# Patient Record
Sex: FEMALE | Race: WHITE | Employment: OTHER | URBAN - NONMETROPOLITAN AREA
[De-identification: names, ages, dates, MRNs, and addresses within clinical notes are randomized per-mention and may not be internally consistent; named-entity substitution may affect disease eponyms.]

---

## 2022-05-24 ENCOUNTER — INITIAL CONSULT (OUTPATIENT)
Dept: PULMONOLOGY | Age: 47
End: 2022-05-24

## 2022-05-24 VITALS
TEMPERATURE: 96.6 F | HEIGHT: 64 IN | WEIGHT: 174.8 LBS | SYSTOLIC BLOOD PRESSURE: 136 MMHG | DIASTOLIC BLOOD PRESSURE: 82 MMHG | OXYGEN SATURATION: 96 % | BODY MASS INDEX: 29.84 KG/M2 | HEART RATE: 86 BPM

## 2022-05-24 DIAGNOSIS — R53.82 CHRONIC FATIGUE: ICD-10-CM

## 2022-05-24 DIAGNOSIS — G47.8 SLEEP PARALYSIS: ICD-10-CM

## 2022-05-24 DIAGNOSIS — R06.89 SLEEP RELATED CHOKING SENSATION: ICD-10-CM

## 2022-05-24 DIAGNOSIS — G47.10 HYPERSOMNIA: Primary | ICD-10-CM

## 2022-05-24 PROCEDURE — 99203 OFFICE O/P NEW LOW 30 MIN: CPT | Performed by: INTERNAL MEDICINE

## 2022-05-24 RX ORDER — MONTELUKAST SODIUM 10 MG/1
TABLET ORAL
COMMUNITY
Start: 2022-05-01

## 2022-05-24 RX ORDER — SERTRALINE HYDROCHLORIDE 100 MG/1
150 TABLET, FILM COATED ORAL DAILY
COMMUNITY

## 2022-05-24 RX ORDER — DIVALPROEX SODIUM 500 MG/1
TABLET, EXTENDED RELEASE ORAL
COMMUNITY
Start: 2022-05-01

## 2022-05-24 NOTE — PROGRESS NOTES
New Sleep Patient H/P    Presentation:  Jerardo Elizondo is referred by Chanda Goss for daytime sleepiness. HST at [facility]  5/9/2022    57 y/o female c/o quite frightening sleep related choking sensation where she wakes up choking and gasping for air this episodes happen frequently for this reason Dr Annabel Murray requested a HST completed at [facility] 5/9/22 but is was non diagnostic for MAYA with a GAURAV of.9, Bebe tells me that se did not sleep well during this test with short sleep time. Another remarkable c/o are palpitations that mainly happen during wakefulness but sporadically at night as well, the cardiologist tells her it is not her heart. Bebe has difficulty staying asleep, wakes up frequently, feels tired, snores. Has always hed problems sleeping, had difficulty staying awake in high  school, has experienced sleep paralysis but not in the last few years. Does not have  preferred sleep position, denies restless legs. Goes to bed at 9:30 pm and gets up at 6 am, takes naps daily  Works as a genealogist    Has been w/u for persistent neurological symptoms: Malaise, weakness, numbness legs. Intermittent vision change R eye. Episodes of stress incontinence. Other symptoms: palpitations, episodes shortness of breath. Trouble swallowing, allergies, heartburn, leaking urine, headaches, anxiety, sleep problems, unexplained lumps, muscle/joint pain, cold intolerance. Multiple sclerosis was ruled out bu neurology and diagnosed with conversion reaction--as per patient report. Other problems include mild intermittent asthma, depression, migraines, HTN, Covid-19 infection, atypical chest pain    Previous evaluation and treatment? Yes  Where? [facility]  Which ones? HST    She denies any history of sleep walking or sleep talking. No history of seizures activity. No history suggestive of restless legs syndrome. No history of bruxism. No history of head injury.     Naps:  Any naps? Yes and are they helpful Yes    Snoring and Apneas:  Do you snore or been told you a snore? Yes  How long have known about your snoring? years  Any witnessed apneas? Yes  Any awakenings with choking or gasping? Yes    Dreams:  Any recurring dreams? No  Hallucinations? No  Sleep Paralysis? Yes  Symptoms of Cataplexy? No    Driving History:  Do you have a CDL or drive long distances for work? No  Any driving accidents in the past year? No  Any sleepiness while driving? No    Weight:  Any change in weight over the past year? No   How about past 5 years? No          Social History     Tobacco Use    Smoking status: Not on file    Smokeless tobacco: Not on file   Substance Use Topics    Alcohol use: Not on file    Drug use: Not on file       Not on File    No current outpatient medications on file. No current facility-administered medications for this visit. No family history on file. Any family history of any sleep problems or any one in your family on CPAP? No    Social History     Tobacco Use    Smoking status: Not on file    Smokeless tobacco: Not on file   Substance Use Topics    Alcohol use: Not on file    Drug use: Not on file     Caffeine Intake: How much soda (pop), coffee, tea, power drinks do you ingest per day? 15 0z of pop  per day. 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 Day shift      Review of Systems:   General/Constitutional: No recent loss of weight or appetite changes. No fever or chills. HENT: Negative. Eyes: Negative. Upper respiratory tract: No nasal stuffiness or post nasal drip. Lower respiratory tract/ lungs: SOB  Cardiovascular: Palpitations  Gastrointestinal: No nausea or vomiting. Neurological: LE weakness, blurry vision  Extremities: No edema. Musculoskeletal: No complaints. Genitourinary: No complaints. Hematological: Negative. Psychiatric/Behavioral: Negative. Skin: No itching.   Physical Exam:    HEIGHTHeight: 5' 4\" (162.6 cm) WEIGHTWeight: 174 lb 12.8 oz (79.3 kg)    BMI:  There is no height or weight on file to calculate BMI. Neck Size: 13.75  Oxygen Sat: 96% on RA    ESS:8   saqli 44  Vitals:   Vitals:    05/24/22 1444   BP: 136/82   Site: Left Upper Arm   Position: Sitting   Pulse: 86   Temp: 96.6 °F (35.9 °C)   SpO2: 96%  Comment: on RA   Weight: 174 lb 12.8 oz (79.3 kg)   Height: 5' 4\" (1.626 m)        Mallampati Score: 1    Physical Exam :  Constitutional: MBMI 30   HENT:   Head: Normocephalic and atraumatic. Mouth/Throat: Oropharynx is clear and moist. No oral thrush. Mallampati   . Large tongue. Retrognathic. Eyes: Conjunctivae are normal. PERRLA. No scleral icterus. Neck: Neck supple. No JVD present. No tracheal deviation present. Cardiovascular: Normal rate, regular rhythm, normal heart sounds. No murmur heard. Pulmonary/Chest: Effort normal and breath sounds normal. No stridor. No respiratory distress. No wheezes. No rales. Abdominal: Soft. No distension. No tenderness. Musculoskeletal: Normal range of motion. Lymphadenopathy:  No cervical adenopathy. Neurological: Alert and oriented to person, place, and time. No focal deficits. Skin: Skin is warm and dry. Patient is not diaphoretic. Psychiatric: Normal behavior with normal mood and affect. Diagnostic Data:    Original or initial  AHI: 0.9    Date of initial study: 5/9/2022 HST        Assessment    Diagnosis Orders   1. Hypersomnia  Baseline Diagnostic Sleep Study    [place] MULTIPLE SLEEP LATENCY TEST    Urine Drug Screen   2. Sleep paralysis  Baseline Diagnostic Sleep Study    [place] MULTIPLE SLEEP LATENCY TEST    Urine Drug Screen   3. Sleep related choking sensation  Baseline Diagnostic Sleep Study    [place] MULTIPLE SLEEP LATENCY TEST    Urine Drug Screen   4.  Chronic fatigue  Baseline Diagnostic Sleep Study    [place] MULTIPLE SLEEP LATENCY TEST    Urine Drug Screen         Plan     Orders Placed This Encounter   Procedures    Urine Drug Screen Standing Status:   Future     Standing Expiration Date:   5/24/2023    Baseline Diagnostic Sleep Study     Standing Status:   Future     Standing Expiration Date:   5/24/2023     Order Specific Question:   Adult or Pediatric     Answer:   Adult Study (>7 Years)     Order Specific Question:   [facility] Sleep [facility]     [id number]     Order Specific Question:   Select Sleep Lab Location     Answer:   [facility]    [place] MULTIPLE SLEEP LATENCY TEST     Standing Status:   Future     Standing Expiration Date:   5/24/2023          Mask Desensitization and Pre study teaching? No  Weight Loss Information Given? Yes  Sleep Hygiene Discussed? Yes    -She was advised to call [employer] regarding supplies if needed.  -She call my office for earlier appointment if needed for worsening of sleep symptoms.  -Bebe Vazquez Finely educated about my impression and plan. Patient verbalizes understanding.

## 2022-08-24 ENCOUNTER — TELEPHONE (OUTPATIENT)
Dept: PULMONOLOGY | Age: 47
End: 2022-08-24